# Patient Record
Sex: MALE | Race: OTHER | HISPANIC OR LATINO | URBAN - METROPOLITAN AREA
[De-identification: names, ages, dates, MRNs, and addresses within clinical notes are randomized per-mention and may not be internally consistent; named-entity substitution may affect disease eponyms.]

---

## 2023-09-21 VITALS
HEART RATE: 77 BPM | RESPIRATION RATE: 20 BRPM | HEIGHT: 65 IN | DIASTOLIC BLOOD PRESSURE: 90 MMHG | SYSTOLIC BLOOD PRESSURE: 152 MMHG | OXYGEN SATURATION: 99 % | TEMPERATURE: 98 F | WEIGHT: 208.34 LBS

## 2023-09-21 RX ORDER — POVIDONE-IODINE 5 %
1 AEROSOL (ML) TOPICAL ONCE
Refills: 0 | Status: COMPLETED | OUTPATIENT
Start: 2023-09-22 | End: 2023-09-22

## 2023-09-21 RX ORDER — CHLORHEXIDINE GLUCONATE 213 G/1000ML
1 SOLUTION TOPICAL EVERY 12 HOURS
Refills: 0 | Status: DISCONTINUED | OUTPATIENT
Start: 2023-09-22 | End: 2023-09-23

## 2023-09-21 NOTE — ASU PATIENT PROFILE, ADULT - NS PRO ABUSE SCREEN SUSPICION NEGLECT YN
Pt called requesting refill on the following: methadone (DOLOPHINE) 10 MG tablet.    Pt call back: [phone]    [facility]-confirmed   no

## 2023-09-21 NOTE — ASU PREOP CHECKLIST - 1.
Dr Fabian informed of elevated H/H;      Pt has no escort home ; MD office aware - Ruth to arrange for escort home.

## 2023-09-21 NOTE — ASU PATIENT PROFILE, ADULT - FALL HARM RISK - UNIVERSAL INTERVENTIONS
Bed in lowest position, wheels locked, appropriate side rails in place/Call bell, personal items and telephone in reach/Instruct patient to call for assistance before getting out of bed or chair/Non-slip footwear when patient is out of bed/Tanner to call system/Physically safe environment - no spills, clutter or unnecessary equipment/Purposeful Proactive Rounding/Room/bathroom lighting operational, light cord in reach

## 2023-09-21 NOTE — ASU PATIENT PROFILE, ADULT - HISTORY OF COVID-19 VACCINATION
[facility]     Dr Trace Cristobal  [address] [place] [id number]  [phone]     If you do not receive a phone call from the Dermatology office, please call their office to schedule an appointment.       In our practice, timely communication to you regarding your test results is a priority. We will communicate your results to you, either by phone call, mail or [facility]. You will always be contacted with test results, even if they are normal.    Please let us know if we are not meeting your expectations in this regard!    DR LANDAVERDE'S OFFICE HOURS ARE AS FOLLOWS:    MON               WE ARE OUT OF THE OFFICE  TUES               8:00 A.M. TO 4:30 P.M.  WED                8:00 A.M. TO 4:30 P.M.  THURS            9:00 A.M. TO 4:30 P.M.  FRI                   8:00 A.M. TO 3:00 P.M.       If you call our office for a medical reason or a med refill after Friday 1 PM, you will not receive an answer or refill until the following Tuesday. (we are out of the office Saturday, Sunday, and Monday)    IF IT IS AN URGENT MESSAGE, WE DO HAVE A TRIAGE NURSE AND AN MD ON CALL.    OFFICE PHONE NUMBER: [phone]  OFFICE FAX NUMBER: [phone]    In the next few weeks, you may receive a Press Ganey survey regarding your most recent clinic visit with us.  Please take a few moments out of your day to accurately evaluate your visit.  We strive to provide you with the best medical care and hope you are happy with our services. Again, thank you for your time and we look forward to your next visit.            
Yes

## 2023-09-21 NOTE — ASU PREOP CHECKLIST - LAST TOOK
Constitutional: She appears well-developed. HENT:   Head: Normocephalic. Right Ear: Hearing and external ear normal.   Left Ear: Hearing and external ear normal.   Nose: Nose normal.   Neck: Normal range of motion. Cardiovascular: Normal rate and regular rhythm. Pulmonary/Chest: Effort normal and breath sounds normal. No respiratory distress. She has no wheezes. She has no rales. Abdominal: Soft. Bowel sounds are normal.   Musculoskeletal:   Right side tender to palpate. Pain worse with movement. Neurological: She is alert. Skin: Skin is dry. Vitals reviewed. ASSESSMENT      ICD-10-CM ICD-9-CM    1. Muscle strain of chest wall, initial encounter S29.011A 848.8 diclofenac sodium (VOLTAREN) 1 % GEL  Restart Naproxen  Continue Norco prn   2. Side pain R10.9 789.00 Urine culture      POCT Urinalysis no Micro   3. Chronic pain syndrome G89.4 338.4 diclofenac sodium (VOLTAREN) 1 % GEL   4. Drug-induced constipation---possible, family unsure when the patient's last BM occurred K59.03 564.09 XR ABDOMEN (KUB) (SINGLE AP VIEW)     E980.5          PLAN    Orders Placed This Encounter   Procedures    Urine culture    XR ABDOMEN (KUB) (SINGLE AP VIEW)    POCT Urinalysis no Micro        Return if symptoms worsen or fail to improve. Patient Instructions       Patient Education        Abdominal Pain: Care Instructions  Your Care Instructions    Abdominal pain has many possible causes. Some aren't serious and get better on their own in a few days. Others need more testing and treatment. If your pain continues or gets worse, you need to be rechecked and may need more tests to find out what is wrong. You may need surgery to correct the problem. Don't ignore new symptoms, such as fever, nausea and vomiting, urination problems, pain that gets worse, and dizziness. These may be signs of a more serious problem. Your doctor may have recommended a follow-up visit in the next 8 to 12 hours.  If you are not getting better, you may need more tests or treatment. The doctor has checked you carefully, but problems can develop later. If you notice any problems or new symptoms, get medical treatment right away. Follow-up care is a key part of your treatment and safety. Be sure to make and go to all appointments, and call your doctor if you are having problems. It's also a good idea to know your test results and keep a list of the medicines you take. How can you care for yourself at home? · Rest until you feel better. · To prevent dehydration, drink plenty of fluids, enough so that your urine is light yellow or clear like water. Choose water and other caffeine-free clear liquids until you feel better. If you have kidney, heart, or liver disease and have to limit fluids, talk with your doctor before you increase the amount of fluids you drink. · If your stomach is upset, eat mild foods, such as rice, dry toast or crackers, bananas, and applesauce. Try eating several small meals instead of two or three large ones. · Wait until 48 hours after all symptoms have gone away before you have spicy foods, alcohol, and drinks that contain caffeine. · Do not eat foods that are high in fat. · Avoid anti-inflammatory medicines such as aspirin, ibuprofen (Advil, Motrin), and naproxen (Aleve). These can cause stomach upset. Talk to your doctor if you take daily aspirin for another health problem. When should you call for help? Call 911 anytime you think you may need emergency care. For example, call if:  ? · You passed out (lost consciousness). ? · You pass maroon or very bloody stools. ? · You vomit blood or what looks like coffee grounds. ? · You have new, severe belly pain. ?Call your doctor now or seek immediate medical care if:  ? · Your pain gets worse, especially if it becomes focused in one area of your belly. ? · You have a new or higher fever.    ? · Your stools are black and look like tar, or they have streaks of blood. ? · You have unexpected vaginal bleeding. ? · You have symptoms of a urinary tract infection. These may include:  ¨ Pain when you urinate. ¨ Urinating more often than usual.  ¨ Blood in your urine. ? · You are dizzy or lightheaded, or you feel like you may faint. ? Watch closely for changes in your health, and be sure to contact your doctor if:  ? · You are not getting better after 1 day (24 hours). Where can you learn more? Go to [email or web address] and sign in to your [employer] account. Enter T091 in the [facility] box to learn more about \"Abdominal Pain: Care Instructions. \"     If you do not have an account, please click on the \"Sign Up Now\" link. Current as of: March 20, 2017  Content Version: 11.5  © 2403-0833 [employer]. Care instructions adapted under license by [facility] ([facility]). If you have questions about a medical condition or this instruction, always ask your healthcare professional. [place] [address] any warranty or liability for your use of this information. Controlled Substances Monitoring:    n/a          Additional Instructions: As always, patient is advised to bring in medication bottles in order to correctly reconcile with our current list.    Chi Fry received counseling on the following healthy behaviors: n/a    Patient given educational materials on dx    I have instructed Chi Fry to complete a self tracking handout on n/a and instructed them to bring it with them to her next appointment. Discussed use, benefit, and side effects of prescribed medications. Barriers to medication compliance addressed. All patient questions answered. Pt voiced understanding.      LOVELY Mccabe solids

## 2023-09-21 NOTE — ASU PREOP CHECKLIST - HAND OFF
Unit RN to OR RN Acitretin Counseling:  I discussed with the patient the risks of acitretin including but not limited to hair loss, dry lips/skin/eyes, liver damage, hyperlipidemia, depression/suicidal ideation, photosensitivity.  Serious rare side effects can include but are not limited to pancreatitis, pseudotumor cerebri, bony changes, clot formation/stroke/heart attack.  Patient understands that alcohol is contraindicated since it can result in liver toxicity and significantly prolong the elimination of the drug by many years.

## 2023-09-22 ENCOUNTER — INPATIENT (INPATIENT)
Facility: HOSPITAL | Age: 28
LOS: 1 days | Discharge: HOME | DRG: 520 | End: 2023-09-24
Attending: NEUROLOGICAL SURGERY | Admitting: NEUROLOGICAL SURGERY
Payer: OTHER MISCELLANEOUS
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
## 2023-09-22 DIAGNOSIS — Z86.79 PERSONAL HISTORY OF OTHER DISEASES OF THE CIRCULATORY SYSTEM: ICD-10-CM

## 2023-09-22 DIAGNOSIS — Z87.39 PERSONAL HISTORY OF OTHER DISEASES OF THE MUSCULOSKELETAL SYSTEM AND CONNECTIVE TISSUE: ICD-10-CM

## 2023-09-22 LAB
BLD GP AB SCN SERPL QL: NEGATIVE — SIGNIFICANT CHANGE UP
RH IG SCN BLD-IMP: POSITIVE — SIGNIFICANT CHANGE UP

## 2023-09-22 PROCEDURE — 88304 TISSUE EXAM BY PATHOLOGIST: CPT | Mod: 26

## 2023-09-22 DEVICE — SURGIFLO HEMOSTATIC MATRIX KIT: Type: IMPLANTABLE DEVICE | Status: FUNCTIONAL

## 2023-09-22 RX ORDER — OXYCODONE AND ACETAMINOPHEN 5; 325 MG/1; MG/1
1 TABLET ORAL
Qty: 20 | Refills: 0
Start: 2023-09-22 | End: 2023-09-26

## 2023-09-22 RX ORDER — ACETAMINOPHEN 500 MG
650 TABLET ORAL EVERY 6 HOURS
Refills: 0 | Status: DISCONTINUED | OUTPATIENT
Start: 2023-09-22 | End: 2023-09-24

## 2023-09-22 RX ORDER — ONDANSETRON 8 MG/1
4 TABLET, FILM COATED ORAL EVERY 6 HOURS
Refills: 0 | Status: DISCONTINUED | OUTPATIENT
Start: 2023-09-22 | End: 2023-09-24

## 2023-09-22 RX ORDER — ASPIRIN/CALCIUM CARB/MAGNESIUM 324 MG
81 TABLET ORAL AT BEDTIME
Refills: 0 | Status: DISCONTINUED | OUTPATIENT
Start: 2023-09-22 | End: 2023-09-24

## 2023-09-22 RX ORDER — APREPITANT 80 MG/1
40 CAPSULE ORAL ONCE
Refills: 0 | Status: COMPLETED | OUTPATIENT
Start: 2023-09-22 | End: 2023-09-22

## 2023-09-22 RX ORDER — ONDANSETRON 8 MG/1
4 TABLET, FILM COATED ORAL
Qty: 0 | Refills: 0 | DISCHARGE
Start: 2023-09-22

## 2023-09-22 RX ORDER — SODIUM CHLORIDE 9 MG/ML
500 INJECTION INTRAMUSCULAR; INTRAVENOUS; SUBCUTANEOUS ONCE
Refills: 0 | Status: COMPLETED | OUTPATIENT
Start: 2023-09-22 | End: 2023-09-22

## 2023-09-22 RX ORDER — CYCLOBENZAPRINE HYDROCHLORIDE 10 MG/1
1 TABLET, FILM COATED ORAL
Qty: 15 | Refills: 0
Start: 2023-09-22 | End: 2023-09-26

## 2023-09-22 RX ORDER — SODIUM CHLORIDE 9 MG/ML
500 INJECTION, SOLUTION INTRAVENOUS ONCE
Refills: 0 | Status: COMPLETED | OUTPATIENT
Start: 2023-09-22 | End: 2023-09-22

## 2023-09-22 RX ORDER — ASPIRIN/CALCIUM CARB/MAGNESIUM 324 MG
1 TABLET ORAL
Qty: 0 | Refills: 0 | DISCHARGE
Start: 2023-09-22

## 2023-09-22 RX ORDER — ACETAMINOPHEN 500 MG
1000 TABLET ORAL ONCE
Refills: 0 | Status: COMPLETED | OUTPATIENT
Start: 2023-09-22 | End: 2023-09-22

## 2023-09-22 RX ORDER — OXYCODONE HYDROCHLORIDE 5 MG/1
1 TABLET ORAL
Qty: 0 | Refills: 0 | DISCHARGE
Start: 2023-09-22

## 2023-09-22 RX ORDER — ACETAMINOPHEN 500 MG
2 TABLET ORAL
Qty: 0 | Refills: 0 | DISCHARGE
Start: 2023-09-22

## 2023-09-22 RX ORDER — ASPIRIN/CALCIUM CARB/MAGNESIUM 324 MG
324 TABLET ORAL ONCE
Refills: 0 | Status: COMPLETED | OUTPATIENT
Start: 2023-09-22 | End: 2023-09-22

## 2023-09-22 RX ORDER — SODIUM CHLORIDE 9 MG/ML
500 INJECTION, SOLUTION INTRAVENOUS
Refills: 0 | Status: DISCONTINUED | OUTPATIENT
Start: 2023-09-22 | End: 2023-09-22

## 2023-09-22 RX ORDER — SENNA PLUS 8.6 MG/1
2 TABLET ORAL
Qty: 0 | Refills: 0 | DISCHARGE
Start: 2023-09-22

## 2023-09-22 RX ORDER — HYDROMORPHONE HYDROCHLORIDE 2 MG/ML
0.5 INJECTION INTRAMUSCULAR; INTRAVENOUS; SUBCUTANEOUS
Refills: 0 | Status: DISCONTINUED | OUTPATIENT
Start: 2023-09-22 | End: 2023-09-22

## 2023-09-22 RX ORDER — SENNA PLUS 8.6 MG/1
2 TABLET ORAL AT BEDTIME
Refills: 0 | Status: DISCONTINUED | OUTPATIENT
Start: 2023-09-22 | End: 2023-09-24

## 2023-09-22 RX ORDER — CELECOXIB 200 MG/1
200 CAPSULE ORAL ONCE
Refills: 0 | Status: COMPLETED | OUTPATIENT
Start: 2023-09-22 | End: 2023-09-22

## 2023-09-22 RX ORDER — OXYCODONE HYDROCHLORIDE 5 MG/1
5 TABLET ORAL EVERY 4 HOURS
Refills: 0 | Status: DISCONTINUED | OUTPATIENT
Start: 2023-09-22 | End: 2023-09-23

## 2023-09-22 RX ADMIN — SENNA PLUS 2 TABLET(S): 8.6 TABLET ORAL at 21:37

## 2023-09-22 RX ADMIN — CELECOXIB 200 MILLIGRAM(S): 200 CAPSULE ORAL at 06:51

## 2023-09-22 RX ADMIN — SODIUM CHLORIDE 500 MILLILITER(S): 9 INJECTION INTRAMUSCULAR; INTRAVENOUS; SUBCUTANEOUS at 16:45

## 2023-09-22 RX ADMIN — Medication 1 APPLICATION(S): at 06:50

## 2023-09-22 RX ADMIN — HYDROMORPHONE HYDROCHLORIDE 0.5 MILLIGRAM(S): 2 INJECTION INTRAMUSCULAR; INTRAVENOUS; SUBCUTANEOUS at 10:50

## 2023-09-22 RX ADMIN — SODIUM CHLORIDE 1000 MILLILITER(S): 9 INJECTION, SOLUTION INTRAVENOUS at 13:47

## 2023-09-22 RX ADMIN — OXYCODONE HYDROCHLORIDE 5 MILLIGRAM(S): 5 TABLET ORAL at 22:48

## 2023-09-22 RX ADMIN — HYDROMORPHONE HYDROCHLORIDE 0.5 MILLIGRAM(S): 2 INJECTION INTRAMUSCULAR; INTRAVENOUS; SUBCUTANEOUS at 11:50

## 2023-09-22 RX ADMIN — Medication 1000 MILLIGRAM(S): at 06:51

## 2023-09-22 RX ADMIN — HYDROMORPHONE HYDROCHLORIDE 0.5 MILLIGRAM(S): 2 INJECTION INTRAMUSCULAR; INTRAVENOUS; SUBCUTANEOUS at 10:35

## 2023-09-22 RX ADMIN — Medication 324 MILLIGRAM(S): at 07:29

## 2023-09-22 RX ADMIN — OXYCODONE HYDROCHLORIDE 5 MILLIGRAM(S): 5 TABLET ORAL at 18:30

## 2023-09-22 RX ADMIN — APREPITANT 40 MILLIGRAM(S): 80 CAPSULE ORAL at 06:51

## 2023-09-22 RX ADMIN — OXYCODONE HYDROCHLORIDE 5 MILLIGRAM(S): 5 TABLET ORAL at 23:40

## 2023-09-22 RX ADMIN — OXYCODONE HYDROCHLORIDE 5 MILLIGRAM(S): 5 TABLET ORAL at 17:56

## 2023-09-22 RX ADMIN — Medication 81 MILLIGRAM(S): at 21:37

## 2023-09-22 RX ADMIN — HYDROMORPHONE HYDROCHLORIDE 0.5 MILLIGRAM(S): 2 INJECTION INTRAMUSCULAR; INTRAVENOUS; SUBCUTANEOUS at 11:35

## 2023-09-22 RX ADMIN — HYDROMORPHONE HYDROCHLORIDE 0.5 MILLIGRAM(S): 2 INJECTION INTRAMUSCULAR; INTRAVENOUS; SUBCUTANEOUS at 10:19

## 2023-09-22 NOTE — H&P ADULT - NSHPPHYSICALEXAM_GEN_ALL_CORE
paravertebral spasm   slr 40 deg positive A&O x 3. PERRL, EOMI  CN ii to XII are grossly intact.   Heart:: S1S2, regular, tachycardia. SR tachy in monitor.  Lung: Clear lung sound.

## 2023-09-22 NOTE — H&P ADULT - HISTORY OF PRESENT ILLNESS
work related injury   pain low back and les  work related injury   pain low back and legs.  hx of tachycardia with negative work up.  Patient was admitted from PACU after L5-S1 laminectomy due to urinary retention and tachycardia.  He got straight cath that put out 1L of urine in PACU. After that he was able to urinate on his own 2 times after and was able to empty his bladder.  He was admitted for observation overnight.

## 2023-09-22 NOTE — PRE-ANESTHESIA EVALUATION ADULT - NSANTHOSAYNRD_GEN_A_CORE
No. KACI screening performed.  STOP BANG Legend: 0-2 = LOW Risk; 3-4 = INTERMEDIATE Risk; 5-8 = HIGH Risk

## 2023-09-22 NOTE — ASU DISCHARGE PLAN (ADULT/PEDIATRIC) - NS MD DC FALL RISK RISK
For information on Fall & Injury Prevention, visit: [email or web address] OR  [email or web address] OR  https://www.cdc.gov/steadi/patient.html

## 2023-09-22 NOTE — H&P ADULT - NSICDXPASTMEDICALHX_GEN_ALL_CORE_FT
PAST MEDICAL HISTORY:  H/O sinus tachycardia     History of low back pain     Work related injury Oct 2022

## 2023-09-22 NOTE — ASU DISCHARGE PLAN (ADULT/PEDIATRIC) - CARE PROVIDER_API CALL
Saurabh Castillo  Neurosurgery  [address], [address]  [place] [address]  Phone: [phone]  Fax: [phone]  Follow Up Time:

## 2023-09-22 NOTE — ASU DISCHARGE PLAN (ADULT/PEDIATRIC) - ASU DC SPECIAL INSTRUCTIONSFT
Please use stool softeners to avoid straining.  Drink plenty of water.  Call Dr. Castillo's office to confirm your follow-up appointment or for any clinical concerns.  No heavy lifting or bending. No driving or strenuous activity until your follow-up with Dr. Castillo

## 2023-09-23 LAB
ALBUMIN SERPL ELPH-MCNC: 4.8 G/DL — SIGNIFICANT CHANGE UP (ref 3.3–5)
ALP SERPL-CCNC: 79 U/L — SIGNIFICANT CHANGE UP (ref 40–120)
ALT FLD-CCNC: 51 U/L — HIGH (ref 10–45)
AMPHET UR-MCNC: NEGATIVE — SIGNIFICANT CHANGE UP
ANION GAP SERPL CALC-SCNC: 10 MMOL/L — SIGNIFICANT CHANGE UP (ref 5–17)
ANION GAP SERPL CALC-SCNC: 9 MMOL/L — SIGNIFICANT CHANGE UP (ref 5–17)
APTT BLD: 29.7 SEC — SIGNIFICANT CHANGE UP (ref 24.5–35.6)
AST SERPL-CCNC: 44 U/L — HIGH (ref 10–40)
BARBITURATES UR SCN-MCNC: NEGATIVE — SIGNIFICANT CHANGE UP
BENZODIAZ UR-MCNC: NEGATIVE — SIGNIFICANT CHANGE UP
BILIRUB SERPL-MCNC: 1 MG/DL — SIGNIFICANT CHANGE UP (ref 0.2–1.2)
BUN SERPL-MCNC: 10 MG/DL — SIGNIFICANT CHANGE UP (ref 7–23)
BUN SERPL-MCNC: 9 MG/DL — SIGNIFICANT CHANGE UP (ref 7–23)
CALCIUM SERPL-MCNC: 9.4 MG/DL — SIGNIFICANT CHANGE UP (ref 8.4–10.5)
CALCIUM SERPL-MCNC: 9.8 MG/DL — SIGNIFICANT CHANGE UP (ref 8.4–10.5)
CHLORIDE SERPL-SCNC: 102 MMOL/L — SIGNIFICANT CHANGE UP (ref 96–108)
CHLORIDE SERPL-SCNC: 106 MMOL/L — SIGNIFICANT CHANGE UP (ref 96–108)
CO2 SERPL-SCNC: 25 MMOL/L — SIGNIFICANT CHANGE UP (ref 22–31)
CO2 SERPL-SCNC: 27 MMOL/L — SIGNIFICANT CHANGE UP (ref 22–31)
COCAINE METAB.OTHER UR-MCNC: NEGATIVE — SIGNIFICANT CHANGE UP
CREAT SERPL-MCNC: 0.79 MG/DL — SIGNIFICANT CHANGE UP (ref 0.5–1.3)
CREAT SERPL-MCNC: 0.83 MG/DL — SIGNIFICANT CHANGE UP (ref 0.5–1.3)
EGFR: 123 ML/MIN/1.73M2 — SIGNIFICANT CHANGE UP
EGFR: 125 ML/MIN/1.73M2 — SIGNIFICANT CHANGE UP
GLUCOSE BLDC GLUCOMTR-MCNC: 111 MG/DL — HIGH (ref 70–99)
GLUCOSE SERPL-MCNC: 106 MG/DL — HIGH (ref 70–99)
GLUCOSE SERPL-MCNC: 115 MG/DL — HIGH (ref 70–99)
HCT VFR BLD CALC: 48.9 % — SIGNIFICANT CHANGE UP (ref 39–50)
HCT VFR BLD CALC: 52.9 % — HIGH (ref 39–50)
HGB BLD-MCNC: 16.9 G/DL — SIGNIFICANT CHANGE UP (ref 13–17)
HGB BLD-MCNC: 17.6 G/DL — HIGH (ref 13–17)
INR BLD: 1.06 — SIGNIFICANT CHANGE UP (ref 0.85–1.18)
MAGNESIUM SERPL-MCNC: 2 MG/DL — SIGNIFICANT CHANGE UP (ref 1.6–2.6)
MAGNESIUM SERPL-MCNC: 2.1 MG/DL — SIGNIFICANT CHANGE UP (ref 1.6–2.6)
MCHC RBC-ENTMCNC: 29.3 PG — SIGNIFICANT CHANGE UP (ref 27–34)
MCHC RBC-ENTMCNC: 29.7 PG — SIGNIFICANT CHANGE UP (ref 27–34)
MCHC RBC-ENTMCNC: 33.3 GM/DL — SIGNIFICANT CHANGE UP (ref 32–36)
MCHC RBC-ENTMCNC: 34.6 GM/DL — SIGNIFICANT CHANGE UP (ref 32–36)
MCV RBC AUTO: 85.9 FL — SIGNIFICANT CHANGE UP (ref 80–100)
MCV RBC AUTO: 88.2 FL — SIGNIFICANT CHANGE UP (ref 80–100)
METHADONE UR-MCNC: NEGATIVE — SIGNIFICANT CHANGE UP
NRBC # BLD: 0 /100 WBCS — SIGNIFICANT CHANGE UP (ref 0–0)
NRBC # BLD: 0 /100 WBCS — SIGNIFICANT CHANGE UP (ref 0–0)
OPIATES UR-MCNC: NEGATIVE — SIGNIFICANT CHANGE UP
PCP SPEC-MCNC: SIGNIFICANT CHANGE UP
PCP UR-MCNC: NEGATIVE — SIGNIFICANT CHANGE UP
PHOSPHATE SERPL-MCNC: 3.4 MG/DL — SIGNIFICANT CHANGE UP (ref 2.5–4.5)
PHOSPHATE SERPL-MCNC: 4.4 MG/DL — SIGNIFICANT CHANGE UP (ref 2.5–4.5)
PLATELET # BLD AUTO: 252 K/UL — SIGNIFICANT CHANGE UP (ref 150–400)
PLATELET # BLD AUTO: 294 K/UL — SIGNIFICANT CHANGE UP (ref 150–400)
POTASSIUM SERPL-MCNC: 3.6 MMOL/L — SIGNIFICANT CHANGE UP (ref 3.5–5.3)
POTASSIUM SERPL-MCNC: 4.6 MMOL/L — SIGNIFICANT CHANGE UP (ref 3.5–5.3)
POTASSIUM SERPL-SCNC: 3.6 MMOL/L — SIGNIFICANT CHANGE UP (ref 3.5–5.3)
POTASSIUM SERPL-SCNC: 4.6 MMOL/L — SIGNIFICANT CHANGE UP (ref 3.5–5.3)
PROT SERPL-MCNC: 7.8 G/DL — SIGNIFICANT CHANGE UP (ref 6–8.3)
PROTHROM AB SERPL-ACNC: 12.1 SEC — SIGNIFICANT CHANGE UP (ref 9.5–13)
RBC # BLD: 5.69 M/UL — SIGNIFICANT CHANGE UP (ref 4.2–5.8)
RBC # BLD: 6 M/UL — HIGH (ref 4.2–5.8)
RBC # FLD: 13.1 % — SIGNIFICANT CHANGE UP (ref 10.3–14.5)
RBC # FLD: 13.2 % — SIGNIFICANT CHANGE UP (ref 10.3–14.5)
SODIUM SERPL-SCNC: 138 MMOL/L — SIGNIFICANT CHANGE UP (ref 135–145)
SODIUM SERPL-SCNC: 141 MMOL/L — SIGNIFICANT CHANGE UP (ref 135–145)
THC UR QL: NEGATIVE — SIGNIFICANT CHANGE UP
WBC # BLD: 15.88 K/UL — HIGH (ref 3.8–10.5)
WBC # BLD: 18.01 K/UL — HIGH (ref 3.8–10.5)
WBC # FLD AUTO: 15.88 K/UL — HIGH (ref 3.8–10.5)
WBC # FLD AUTO: 18.01 K/UL — HIGH (ref 3.8–10.5)

## 2023-09-23 PROCEDURE — 99254 IP/OBS CNSLTJ NEW/EST MOD 60: CPT

## 2023-09-23 PROCEDURE — 99291 CRITICAL CARE FIRST HOUR: CPT

## 2023-09-23 PROCEDURE — 72125 CT NECK SPINE W/O DYE: CPT | Mod: 26

## 2023-09-23 PROCEDURE — 70450 CT HEAD/BRAIN W/O DYE: CPT | Mod: 26,77

## 2023-09-23 PROCEDURE — 72131 CT LUMBAR SPINE W/O DYE: CPT | Mod: 26

## 2023-09-23 PROCEDURE — 70450 CT HEAD/BRAIN W/O DYE: CPT | Mod: 26

## 2023-09-23 RX ORDER — METHOCARBAMOL 500 MG/1
500 TABLET, FILM COATED ORAL EVERY 8 HOURS
Refills: 0 | Status: DISCONTINUED | OUTPATIENT
Start: 2023-09-23 | End: 2023-09-23

## 2023-09-23 RX ORDER — POTASSIUM CHLORIDE 20 MEQ
40 PACKET (EA) ORAL ONCE
Refills: 0 | Status: COMPLETED | OUTPATIENT
Start: 2023-09-23 | End: 2023-09-23

## 2023-09-23 RX ORDER — TRAMADOL HYDROCHLORIDE 50 MG/1
25 TABLET ORAL EVERY 4 HOURS
Refills: 0 | Status: DISCONTINUED | OUTPATIENT
Start: 2023-09-23 | End: 2023-09-24

## 2023-09-23 RX ORDER — ACETAMINOPHEN 500 MG
1000 TABLET ORAL EVERY 6 HOURS
Refills: 0 | Status: DISCONTINUED | OUTPATIENT
Start: 2023-09-23 | End: 2023-09-24

## 2023-09-23 RX ORDER — TRAMADOL HYDROCHLORIDE 50 MG/1
50 TABLET ORAL EVERY 4 HOURS
Refills: 0 | Status: DISCONTINUED | OUTPATIENT
Start: 2023-09-23 | End: 2023-09-24

## 2023-09-23 RX ADMIN — Medication 81 MILLIGRAM(S): at 21:38

## 2023-09-23 RX ADMIN — TRAMADOL HYDROCHLORIDE 50 MILLIGRAM(S): 50 TABLET ORAL at 22:15

## 2023-09-23 RX ADMIN — TRAMADOL HYDROCHLORIDE 50 MILLIGRAM(S): 50 TABLET ORAL at 18:08

## 2023-09-23 RX ADMIN — Medication 40 MILLIEQUIVALENT(S): at 17:09

## 2023-09-23 RX ADMIN — TRAMADOL HYDROCHLORIDE 50 MILLIGRAM(S): 50 TABLET ORAL at 17:07

## 2023-09-23 RX ADMIN — TRAMADOL HYDROCHLORIDE 50 MILLIGRAM(S): 50 TABLET ORAL at 21:39

## 2023-09-23 RX ADMIN — SENNA PLUS 2 TABLET(S): 8.6 TABLET ORAL at 21:38

## 2023-09-23 NOTE — DISCHARGE NOTE PROVIDER - NSDCMRMEDTOKEN_GEN_ALL_CORE_FT
acetaminophen 325 mg oral tablet: 2 tab(s) orally every 6 hours As needed Temp greater or equal to 38C (100.4F), Mild Pain (1 - 3)  aspirin 81 mg oral delayed release tablet: 1 tab(s) orally once a day (at bedtime)  cyclobenzaprine 10 mg oral tablet: 1 tab(s) orally 3 times a day as needed for  muscle spasm MDD: 3  Innerclean oral tablet: 2 tab(s) orally once a day (at bedtime)  Medrol Dosepak 4 mg oral tablet: 1 tab(s) orally 4 times a day Take as directed MDD: 4  Percocet 5 mg-325 mg oral tablet: 1 tab(s) orally every 6 hours as needed for  severe pain MDD: 4   acetaminophen 325 mg oral tablet: 2 tab(s) orally every 6 hours As needed Temp greater or equal to 38C (100.4F), Mild Pain (1 - 3)  aspirin 81 mg oral delayed release tablet: 1 tab(s) orally once a day  Innerclean oral tablet: 2 tab(s) orally once a day (at bedtime)  Medrol Dosepak 4 mg oral tablet: 1 tab(s) orally 4 times a day Take as directed MDD: 4  traMADol 50 mg oral tablet: 1 tab(s) orally every 6 hours as needed for Severe Pain (7 - 10) 1 tab every 6 hours as needed for severe pain or 0.5 tabs every 6 hours as needed for moderate pain. MDD: 4 tabs

## 2023-09-23 NOTE — CONSULT NOTE ADULT - ASSESSMENT
27M no PMHx suffered work-related back injury resulting in LBP radiating down BL LE. S/p elective L5-S1 laminectomy (9/22/23).    #Lumbar back pain  #post operative state  - s/p elective L5-S1 laminectomy (9/22/23)  - Pain management per primary team 27M no PMHx suffered work-related back injury resulting in LBP radiating down BL LE. S/p elective L5-S1 laminectomy (9/22/23).    #Lumbar back pain  #post operative state  - s/p elective L5-S1 laminectomy (9/22/23)  - Pain management per primary team    #Vasovagal episode  #Fall  - Patient off tele standing in bathroom at time of event, tele reviewed episode of sinus ben at 5am while sleeping.  No other events  - caution with pain meds  - orthostatics  - CT head with small calcification vs bleed, CT C spine w/o fracture  - plan to repeat CT head for stability    #Erythrocytosis  - unclear etiology, Hgb 17 preop  - no signs of KACI, oxygen limiting events  - plan to f/u with heme outpatient  - inquire about anabolic steroids    #Leukocytosis  - likely reactive post op  - downtrending

## 2023-09-23 NOTE — CHART NOTE - NSCHARTNOTEFT_GEN_A_CORE
Patient was discharged by neurosurgery team. Patient then used restroom in hospital and reports he felt dizzy and fell. Reports he hit his head, denies LOC. Rapid response called. Patient c/o lumbar pain. Patient put in c-collar and used hard board to transfer patient from floor to stretched. Taken for urgent CTH, CT cervical/lumbar spine. Patient AAAOx3 KOMAL x4, exam at baseline.

## 2023-09-23 NOTE — DISCHARGE NOTE PROVIDER - NSDCFUADDINST_GEN_ALL_CORE_FT
Neurosurgery follow up appointment date/time:  - please call the office to confirm appointment: [phone]    Wound Care:  - keep dressing on until tomorrow, 9/24.  - You may shower 5 days after surgery. No bathing. Pat dry with a clean towel and leave open to air.  - no picking at incision    Activity:  - fatigue is common after surgery, rest if you feel tired   - no bending, lifting, twisting or heavy lifting   - walking is recommended, ambulate as tolerated  - no soaking in a tub/pool/hot tub   - no driving within 24 hours of anesthesia or while taking prescription pain medications   - keep hydrated, drink plenty of water     Please also follow up with your primary care doctor.     Pain Expectations:  - pain after surgery is expected  - please take pain meds as prescribed     Medications:  - You should take Percocet for moderate-severe pain as needed, and Flexeril for muscle spasm as needed.  - Please take the Medrol dose pack as directed on the package  - You should use stool softeners after surgery to avoid straining and pain. Pain medication can make you constipated.  - Avoid taking Advil (ibuprofen), Motrin (naproxen), or Aspirin for pain as they can cause bleeding   - Follow-up with Dr. Castillo directly if you need to resume Aspirin    Call the office or come to ED if:  - wound has drainage or bleeding, increased redness or pain at incision site, neurological change, fever (>101), chills, night sweats, syncope, nausea/vomiting, chest pain, shortness of breath      WITHIN 24 HOURS OF DISCHARGE, PLEASE CONTACT NEURO PA  WITH ANY QUESTIONS OR CONCERNS: [phone]   OTHERWISE, PLEASE CALL THE OFFICE WITH ANY QUESTIONS OR CONCERNS:  Neurosurgery follow up appointment date/time:  - please call the office to confirm appointment: [phone]    Wound Care:  - keep dressing on until tomorrow, 9/24.  - You may shower 5 days after surgery. No bathing. Pat dry with a clean towel and leave open to air.  - no picking at incision    Activity:  - fatigue is common after surgery, rest if you feel tired   - no bending, lifting, twisting or heavy lifting   - walking is recommended, ambulate as tolerated  - no soaking in a tub/pool/hot tub   - no driving within 24 hours of anesthesia or while taking prescription pain medications   - keep hydrated, drink plenty of water     Please also follow up with your primary care doctor.     Pain Expectations:  - pain after surgery is expected  - please take pain meds as prescribed     Medications:  - You should take Percocet for moderate-severe pain as needed, and Flexeril for muscle spasm as needed.  - Please take the Medrol dose pack as directed on the package  - You should use stool softeners after surgery to avoid straining and pain. Pain medication can make you constipated.  - You should resume your Aspirin 81mg daily dose	  - Avoid taking Advil (ibuprofen), Motrin (naproxen) for pain as they can cause bleeding       Call the office or come to ED if:  - wound has drainage or bleeding, increased redness or pain at incision site, neurological change, fever (>101), chills, night sweats, syncope, nausea/vomiting, chest pain, shortness of breath      WITHIN 24 HOURS OF DISCHARGE, PLEASE CONTACT NEURO PA  WITH ANY QUESTIONS OR CONCERNS: [phone]   OTHERWISE, PLEASE CALL THE OFFICE WITH ANY QUESTIONS OR CONCERNS:  Neurosurgery follow up appointment date/time:  - please call the office to confirm appointment: [phone]  - please follow up with a hematologist for your elevated hemoglobin.     Wound Care:  - keep dressing on until tomorrow, 9/24.  - You may shower tonight. Remove dressing when you get home and pat incision dry with a clean towel. Leave incision open to air. No lotions or creams.   - no picking at incision    Activity:  - fatigue is common after surgery, rest if you feel tired   - no bending, lifting, twisting or heavy lifting   - walking is recommended, ambulate as tolerated  - no soaking in a tub/pool/hot tub   - no driving within 24 hours of anesthesia or while taking prescription pain medications   - keep hydrated, drink plenty of water     Please also follow up with your primary care doctor.     Pain Expectations:  - pain after surgery is expected  - please take pain meds as prescribed     Medications:  - You should take Percocet every 6 hours as needed for moderate-severe pain as needed, and Flexeril every 8 hours for muscle spasm as needed.  - Please take the Medrol dose pack as directed on the package  - You should use stool softeners after surgery to avoid straining and pain. Pain medication can make you constipated.  - You should resume your Aspirin 81mg daily dose	  - Avoid taking Advil (ibuprofen), Motrin (naproxen) for pain as they can cause bleeding       Call the office or come to ED if:  - wound has drainage or bleeding, increased redness or pain at incision site, neurological change, fever (>101), chills, night sweats, syncope, nausea/vomiting, chest pain, shortness of breath      WITHIN 24 HOURS OF DISCHARGE, PLEASE CONTACT NEURO PA  WITH ANY QUESTIONS OR CONCERNS: [phone]   OTHERWISE, PLEASE CALL THE OFFICE WITH ANY QUESTIONS OR CONCERNS: [phone].  Neurosurgery follow up appointment date/time:  - please call the office to confirm appointment: [phone]  - please follow up with a hematologist for your elevated hemoglobin. Follow up with your primary care provider to help facilitate this.     Wound Care:  - You may shower tonight. pat incision dry with a clean towel. Leave incision open to air. No lotions or creams.   - no picking at incision    Activity:  - fatigue is common after surgery, rest if you feel tired   - no bending, lifting, twisting or heavy lifting   - walking is recommended, ambulate as tolerated  - no soaking in a tub/pool/hot tub   - no driving within 24 hours of anesthesia or while taking prescription pain medications   - keep hydrated, drink plenty of water     Please also follow up with your primary care doctor.     Pain Expectations:  - pain after surgery is expected  - please take pain meds as prescribed     Medications:  - You should take Tramadol 50mg every 6 hours as needed for severe pain and 25mg every 6 hours as needed for moderate pain.   - Please take the Medrol dose pack as directed on the package  - You should use stool softeners after surgery to avoid straining and pain. Pain medication can make you constipated.  - You should resume your Aspirin 81mg daily dose	  - Avoid taking Advil (ibuprofen), Motrin (naproxen) for pain as they can cause bleeding       Call the office or come to ED if:  - wound has drainage or bleeding, increased redness or pain at incision site, neurological change, fever (>101), chills, night sweats, syncope, nausea/vomiting, chest pain, shortness of breath      WITHIN 24 HOURS OF DISCHARGE, PLEASE CONTACT NEURO PA  WITH ANY QUESTIONS OR CONCERNS: [phone]   OTHERWISE, PLEASE CALL THE OFFICE WITH ANY QUESTIONS OR CONCERNS: [phone].

## 2023-09-23 NOTE — CHART NOTE - NSCHARTNOTEFT_GEN_A_CORE
***Rapid Response Clinical Impact Nurse Practitioner Note***    HPI:  work related injury   pain low back and legs.  hx of tachycardia with negative work up.  Patient was admitted from PACU after L5-S1 laminectomy due to urinary retention and tachycardia.  He got straight cath that put out 1L of urine in PACU. After that he was able to urinate on his own 2 times after and was able to empty his bladder.  He was admitted for observation overnight. (22 Sep 2023 06:24)      Rapid response team called because    Patient was seen and examined at the bedside by the rapid response team.    Allergies    No Known Allergies    Intolerances        PAST MEDICAL & SURGICAL HISTORY:  Work related injury  Oct 2022      History of low back pain      H/O sinus tachycardia      History of back surgery  Dec 2022- lumbar          Vital Signs Last 24 Hrs  T(C): 36.6 (23 Sep 2023 10:03), Max: 36.7 (22 Sep 2023 16:47)  T(F): 97.8 (23 Sep 2023 10:03), Max: 98.1 (22 Sep 2023 16:47)  HR: 83 (23 Sep 2023 10:03) (74 - 120)  BP: 154/70 (23 Sep 2023 10:03) (121/73 - 154/70)  BP(mean): 85 (22 Sep 2023 16:47) (85 - 94)  RR: 19 (23 Sep 2023 10:03) (10 - 25)  SpO2: 97% (23 Sep 2023 10:03) (97% - 99%)    Parameters below as of 23 Sep 2023 10:03  Patient On (Oxygen Delivery Method): room air        ROS:     GENERAL: The patient is awake and alert in no apparent distress.   HEENT: Head is normocephalic and atraumatic. Extraocular muscles are intact. Mucous membranes are moist. No throat erythema/exudates no lymphadenopathy, no JVD,   NECK: Supple.  LUNGS: Clear to auscultation BL without wheezing, rales or rhonchi; respirations unlabored  HEART: Regular rate and rhythm ,+S1/+S2, no murmurs, rubs, gallops  ABDOMEN: Soft, nontender, and nondistended, no rebound, guarding rigidity, bowel sounds in all 4 quadrants  EXTREMITIES: Without any cyanosis, clubbing, rash, lesions or edema.  SKIN: No new rashes or lesions.  MSK: strength equal BL  VASCULAR: Radial and Dorsal pedal pulses palpable BL  NEUROLOGIC: Grossly intact.  PSYCH: No new changes.    09-22 @ 07:01  -  09-23 @ 07:00  --------------------------------------------------------  IN: 1980 mL / OUT: 3950 mL / NET: -1970 mL                              17.6   18.01 )-----------( 294      ( 23 Sep 2023 06:30 )             52.9     09-23    138  |  102  |  9   ----------------------------<  115<H>  4.6   |  27  |  0.83    Ca    9.8      23 Sep 2023 06:30  Phos  4.4     09-23  Mg     2.0     09-23    TPro  7.8  /  Alb  4.8  /  TBili  1.0  /  DBili  x   /  AST  44<H>  /  ALT  51<H>  /  AlkPhos  79  09-23         LIVER FUNCTIONS - ( 23 Sep 2023 06:30 )  Alb: 4.8 g/dL / Pro: 7.8 g/dL / ALK PHOS: 79 U/L / ALT: 51 U/L / AST: 44 U/L / GGT: x         Urinalysis Basic - ( 23 Sep 2023 06:30 )    Color: x / Appearance: x / SG: x / pH: x  Gluc: 115 mg/dL / Ketone: x  / Bili: x / Urobili: x   Blood: x / Protein: x / Nitrite: x   Leuk Esterase: x / RBC: x / WBC x   Sq Epi: x / Non Sq Epi: x / Bacteria: x             MEDICATIONS  (STANDING):  aspirin enteric coated 81 milliGRAM(s) Oral at bedtime  senna 2 Tablet(s) Oral at bedtime    MEDICATIONS  (PRN):  acetaminophen     Tablet .. 650 milliGRAM(s) Oral every 6 hours PRN Temp greater or equal to 38C (100.4F), Mild Pain (1 - 3)  acetaminophen     Tablet .. 1000 milliGRAM(s) Oral every 6 hours PRN Mild Pain (1 - 3)  ondansetron Injectable 4 milliGRAM(s) IV Push every 6 hours PRN Nausea and/or Vomiting  traMADol 50 milliGRAM(s) Oral every 4 hours PRN Severe Pain (7 - 10)  traMADol 25 milliGRAM(s) Oral every 4 hours PRN Moderate Pain (4 - 6)      Assessment- Rapid Response called for 27y year old Male with a past medical history of     Plan- ***Rapid Response Clinical Impact Nurse Practitioner Note***    HPI:  work related injury   pain low back and legs.  hx of tachycardia with negative work up.  Patient was admitted from PACU after L5-S1 laminectomy due to urinary retention and tachycardia.  He got straight cath that put out 1L of urine in PACU. After that he was able to urinate on his own 2 times after and was able to empty his bladder.  He was admitted for observation overnight. (22 Sep 2023 06:24)    Rapid response team called at approx [phone] for fall. Pt was washing his face at the sink in the patient bathroom when he suddenly felt flush and dizzy. Pt attempted to grab hold of handrail but fell forward reportedly striking his forehead against the handrail. Pt stood himself up and attempted to walk back to his bed then syncopized.     Patient was seen and examined at the bedside by the rapid response team. Upon arrival of writer, pt found laying supine on the floor, awake, alert, with recollection of events as described above. Manual c-spine stabilization applied. Pt complaining of 10/10 back pain. NS team at bedside. C-collar applied. Pt logged rolled onto backboard and lifted back to bed maintaining spinal precautions. Pt prepared for urgent CTH, CT cervical/lumbar spine.    Allergies    No Known Allergies    Intolerances    PAST MEDICAL & SURGICAL HISTORY:  Work related injury  Oct 2022  History of low back pain  H/O sinus tachycardia  History of back surgery  Dec 2022- lumbar    Vital Signs Last 24 Hrs  T(C): 36.6 (23 Sep 2023 10:03), Max: 36.7 (22 Sep 2023 16:47)  T(F): 97.8 (23 Sep 2023 10:03), Max: 98.1 (22 Sep 2023 16:47)  HR: 83 (23 Sep 2023 10:03) (74 - 120)  BP: 154/70 (23 Sep 2023 10:03) (121/73 - 154/70)  BP(mean): 85 (22 Sep 2023 16:47) (85 - 94)  RR: 19 (23 Sep 2023 10:03) (10 - 25)  SpO2: 97% (23 Sep 2023 10:03) (97% - 99%)    Parameters below as of 23 Sep 2023 10:03  Patient On (Oxygen Delivery Method): room air    REVIEW OF SYSTEMS:  CONSTITUTIONAL: No weakness, fevers or chills  EYES/ENT: No visual changes;  No vertigo or throat pain   NECK: + pain  RESPIRATORY: No cough, wheezing, hemoptysis; No shortness of breath  CARDIOVASCULAR: No chest pain or palpitations  GASTROINTESTINAL: No abdominal or epigastric pain. No nausea, vomiting, or hematemesis; No diarrhea or constipation. No melena or hematochezia.  GENITOURINARY: No dysuria, frequency or hematuria  NEUROLOGICAL: No numbness or weakness  SKIN: No itching, burning, rashes, or lesions   All other review of systems is negative unless indicated above.    GENERAL: The patient is awake and alert, appears mildly distressed.   HEENT: Head is normocephalic and atraumatic. Extraocular muscles are intact. Mucous membranes are moist. No throat erythema/exudates no lymphadenopathy, no JVD,   NECK: c-collar  LUNGS: Clear to auscultation BL without wheezing, rales or rhonchi; respirations unlabored  HEART: Regular rate and rhythm ,+S1/+S2, no murmurs, rubs, gallops  ABDOMEN: Soft, nontender, and nondistended, no rebound, guarding rigidity, bowel sounds in all 4 quadrants  EXTREMITIES: Without any cyanosis, clubbing, rash, lesions or edema.  SKIN: No new rashes or lesions.  MSK: strength equal BL  VASCULAR: Radial and Dorsal pedal pulses palpable BL  NEUROLOGIC: Grossly intact.  PSYCH: No new changes.    09-22 @ 07:01  -  09-23 @ 07:00  --------------------------------------------------------  IN: 1980 mL / OUT: 3950 mL / NET: -1970 mL                          17.6   18.01 )-----------( 294      ( 23 Sep 2023 06:30 )             52.9     09-23    138  |  102  |  9   ----------------------------<  115<H>  4.6   |  27  |  0.83    Ca    9.8      23 Sep 2023 06:30  Phos  4.4     09-23  Mg     2.0     09-23    TPro  7.8  /  Alb  4.8  /  TBili  1.0  /  DBili  x   /  AST  44<H>  /  ALT  51<H>  /  AlkPhos  79  09-23         LIVER FUNCTIONS - ( 23 Sep 2023 06:30 )  Alb: 4.8 g/dL / Pro: 7.8 g/dL / ALK PHOS: 79 U/L / ALT: 51 U/L / AST: 44 U/L / GGT: x         Urinalysis Basic - ( 23 Sep 2023 06:30 )    Color: x / Appearance: x / SG: x / pH: x  Gluc: 115 mg/dL / Ketone: x  / Bili: x / Urobili: x   Blood: x / Protein: x / Nitrite: x   Leuk Esterase: x / RBC: x / WBC x   Sq Epi: x / Non Sq Epi: x / Bacteria: x      MEDICATIONS  (STANDING):  aspirin enteric coated 81 milliGRAM(s) Oral at bedtime  senna 2 Tablet(s) Oral at bedtime    MEDICATIONS  (PRN):  acetaminophen     Tablet .. 650 milliGRAM(s) Oral every 6 hours PRN Temp greater or equal to 38C (100.4F), Mild Pain (1 - 3)  acetaminophen     Tablet .. 1000 milliGRAM(s) Oral every 6 hours PRN Mild Pain (1 - 3)  ondansetron Injectable 4 milliGRAM(s) IV Push every 6 hours PRN Nausea and/or Vomiting  traMADol 50 milliGRAM(s) Oral every 4 hours PRN Severe Pain (7 - 10)  traMADol 25 milliGRAM(s) Oral every 4 hours PRN Moderate Pain (4 - 6)      Assessment-   27M with PMHx  work-related back injury resulting, admitted for elective L5-S1 laminectomy (9/22/23), now s/p RRT for syncope likely vasovagal with fall with concern for injury.     Plan-  Maintain spine precautions.  Stat CT head and spine.  Orthostatics when cleared for ambulation.  Reevaluated pain regimen. Caution with narcotics.  Fall precautions.  Maintain telemetry.    Case discussed with ICU consult, PCCM Fellow and primary team.      I have personally and independently provided 31 minutes of critical care services.  This excludes any time spent on separate procedures or teaching.

## 2023-09-23 NOTE — DISCHARGE NOTE PROVIDER - NSDCCPCAREPLAN_GEN_ALL_CORE_FT
PRINCIPAL DISCHARGE DIAGNOSIS  Diagnosis: History of low back pain  Assessment and Plan of Treatment: L5-S1      SECONDARY DISCHARGE DIAGNOSES  Diagnosis: Urinary retention  Assessment and Plan of Treatment: improved

## 2023-09-23 NOTE — CONSULT NOTE ADULT - SUBJECTIVE AND OBJECTIVE BOX
Patient is a 27y old  Male who presents with a chief complaint of pain low back radiating down right leg. (23 Sep 2023 08:46)      HPI:  work related injury   pain low back and legs.  hx of tachycardia with negative work up.  Patient was admitted from PACU after L5-S1 laminectomy due to urinary retention and tachycardia.  He got straight cath that put out 1L of urine in PACU. After that he was able to urinate on his own 2 times after and was able to empty his bladder.  He was admitted for observation overnight. (22 Sep 2023 06:24)      REVIEW OF SYSTEMS: see HPI    PAST MEDICAL & SURGICAL HISTORY:  Work related injury  Oct 2022      History of low back pain      H/O sinus tachycardia      History of back surgery  Dec 2022- lumbar          FAMILY HISTORY:    SOCIAL HISTORY:  Tobacco use:  EtOH use:  Recreational drug use:    MEDICATIONS:  MEDICATIONS  (STANDING):  aspirin enteric coated 81 milliGRAM(s) Oral at bedtime  potassium chloride    Tablet ER 40 milliEquivalent(s) Oral once  senna 2 Tablet(s) Oral at bedtime    MEDICATIONS  (PRN):  acetaminophen     Tablet .. 650 milliGRAM(s) Oral every 6 hours PRN Temp greater or equal to 38C (100.4F), Mild Pain (1 - 3)  acetaminophen     Tablet .. 1000 milliGRAM(s) Oral every 6 hours PRN Mild Pain (1 - 3)  ondansetron Injectable 4 milliGRAM(s) IV Push every 6 hours PRN Nausea and/or Vomiting  traMADol 50 milliGRAM(s) Oral every 4 hours PRN Severe Pain (7 - 10)  traMADol 25 milliGRAM(s) Oral every 4 hours PRN Moderate Pain (4 - 6)      ALLERGIES:  Allergies    No Known Allergies    Intolerances        VITAL SIGNS:  Vital Signs Last 24 Hrs  T(C): 36.4 (23 Sep 2023 16:50), Max: 36.7 (22 Sep 2023 17:48)  T(F): 97.6 (23 Sep 2023 16:50), Max: 98 (22 Sep 2023 17:48)  HR: 74 (23 Sep 2023 16:50) (74 - 110)  BP: 159/69 (23 Sep 2023 16:50) (124/58 - 159/69)  BP(mean): --  RR: 18 (23 Sep 2023 16:50) (17 - 22)  SpO2: 97% (23 Sep 2023 16:50) (97% - 99%)    Parameters below as of 23 Sep 2023 16:50  Patient On (Oxygen Delivery Method): room air        09-22-23 @ 07:01  -  09-23-23 @ 07:00  --------------------------------------------------------  IN:    IV PiggyBack: 1000 mL    Oral Fluid: 980 mL  Total IN: 1980 mL    OUT:    Intermittent Catheterization - Urethral (mL): 1000 mL    Voided (mL): 2950 mL  Total OUT: 3950 mL    Total NET: -1970 mL      09-23-23 @ 07:01  -  09-23-23 @ 16:52  --------------------------------------------------------  IN:    Oral Fluid: 700 mL  Total IN: 700 mL    OUT:    Voided (mL): 500 mL  Total OUT: 500 mL    Total NET: 200 mL        PHYSICAL EXAM:  Constitutional: NAD, comfortable in bed.  HEENT: NC/AT, PERRLA, EOMI, MMM  Neck: Supple, in C collar  Respiratory: CTA B/L. No w/r/r.   Cardiovascular: RRR, normal S1 and S2, no m/r/g.   Gastrointestinal: +BS, soft NTND, no guarding or rebound tenderness, no palpable masses   Extremities: wwp; no cyanosis, clubbing or edema.   Vascular: Pulses equal and strong throughout.   Neurological: AAOx3, no CN deficits, strength and sensation intact throughout.   Skin: No gross skin abnormalities or rashes        LABS:                        16.9   15.88 )-----------( 252      ( 23 Sep 2023 12:47 )             48.9     09-23    141  |  106  |  10  ----------------------------<  106<H>  3.6   |  25  |  0.79    Ca    9.4      23 Sep 2023 12:47  Phos  3.4     09-23  Mg     2.1     09-23    TPro  7.8  /  Alb  4.8  /  TBili  1.0  /  DBili  x   /  AST  44<H>  /  ALT  51<H>  /  AlkPhos  79  09-23    PT/INR - ( 23 Sep 2023 12:47 )   PT: 12.1 sec;   INR: 1.06          PTT - ( 23 Sep 2023 12:47 )  PTT:29.7 sec  Urinalysis Basic - ( 23 Sep 2023 12:47 )    Color: x / Appearance: x / SG: x / pH: x  Gluc: 106 mg/dL / Ketone: x  / Bili: x / Urobili: x   Blood: x / Protein: x / Nitrite: x   Leuk Esterase: x / RBC: x / WBC x   Sq Epi: x / Non Sq Epi: x / Bacteria: x          CAPILLARY BLOOD GLUCOSE      POCT Blood Glucose.: 111 mg/dL (23 Sep 2023 10:58)          RADIOLOGY & ADDITIONAL TESTS: Reviewed. Patient is a 27y old  Male who presents with a chief complaint of pain low back radiating down right leg. (23 Sep 2023 08:46)      HPI:  work related injury   pain low back and legs.  hx of tachycardia with negative work up.  Patient was admitted from PACU after L5-S1 laminectomy due to urinary retention and tachycardia.  He got straight cath that put out 1L of urine in PACU. After that he was able to urinate on his own 2 times after and was able to empty his bladder.  He was admitted for observation overnight. (22 Sep 2023 06:24)    Patient seen and examined at bedside.  Rapid response after fall in bathroom with head contact.  Description of event similar to vagal event in bathroom.  Was off tele at the time.  Currently with some back pain and discomfort due to C collar after CT C spine and head.  Has some dizziness.  Denies fever, chills, CP, SOB, N/V, abdominal pain.    REVIEW OF SYSTEMS: see HPI    PAST MEDICAL & SURGICAL HISTORY:  Work related injury  Oct 2022      History of low back pain      H/O sinus tachycardia      History of back surgery  Dec 2022- lumbar      MEDICATIONS:  MEDICATIONS  (STANDING):  aspirin enteric coated 81 milliGRAM(s) Oral at bedtime  potassium chloride    Tablet ER 40 milliEquivalent(s) Oral once  senna 2 Tablet(s) Oral at bedtime    MEDICATIONS  (PRN):  acetaminophen     Tablet .. 650 milliGRAM(s) Oral every 6 hours PRN Temp greater or equal to 38C (100.4F), Mild Pain (1 - 3)  acetaminophen     Tablet .. 1000 milliGRAM(s) Oral every 6 hours PRN Mild Pain (1 - 3)  ondansetron Injectable 4 milliGRAM(s) IV Push every 6 hours PRN Nausea and/or Vomiting  traMADol 50 milliGRAM(s) Oral every 4 hours PRN Severe Pain (7 - 10)  traMADol 25 milliGRAM(s) Oral every 4 hours PRN Moderate Pain (4 - 6)      ALLERGIES:  Allergies    No Known Allergies    Intolerances        VITAL SIGNS:  Vital Signs Last 24 Hrs  T(C): 36.4 (23 Sep 2023 16:50), Max: 36.7 (22 Sep 2023 17:48)  T(F): 97.6 (23 Sep 2023 16:50), Max: 98 (22 Sep 2023 17:48)  HR: 74 (23 Sep 2023 16:50) (74 - 110)  BP: 159/69 (23 Sep 2023 16:50) (124/58 - 159/69)  BP(mean): --  RR: 18 (23 Sep 2023 16:50) (17 - 22)  SpO2: 97% (23 Sep 2023 16:50) (97% - 99%)    Parameters below as of 23 Sep 2023 16:50  Patient On (Oxygen Delivery Method): room air        09-22-23 @ 07:01  -  09-23-23 @ 07:00  --------------------------------------------------------  IN:    IV PiggyBack: 1000 mL    Oral Fluid: 980 mL  Total IN: 1980 mL    OUT:    Intermittent Catheterization - Urethral (mL): 1000 mL    Voided (mL): 2950 mL  Total OUT: 3950 mL    Total NET: -1970 mL      09-23-23 @ 07:01  -  09-23-23 @ 16:52  --------------------------------------------------------  IN:    Oral Fluid: 700 mL  Total IN: 700 mL    OUT:    Voided (mL): 500 mL  Total OUT: 500 mL    Total NET: 200 mL        PHYSICAL EXAM:  Constitutional: NAD, comfortable in bed.  HEENT: NC/AT, PERRLA, EOMI, MMM  Neck: Supple, in C collar  Respiratory: CTA B/L. No w/r/r.   Cardiovascular: RRR, normal S1 and S2, no m/r/g.   Gastrointestinal: +BS, soft NTND, no guarding or rebound tenderness, no palpable masses   Extremities: wwp; no cyanosis, clubbing or edema.   Vascular: Pulses equal and strong throughout.   Neurological: AAOx3, no CN deficits, strength and sensation intact throughout.   Skin: No gross skin abnormalities or rashes        LABS:                        16.9   15.88 )-----------( 252      ( 23 Sep 2023 12:47 )             48.9     09-23    141  |  106  |  10  ----------------------------<  106<H>  3.6   |  25  |  0.79    Ca    9.4      23 Sep 2023 12:47  Phos  3.4     09-23  Mg     2.1     09-23    TPro  7.8  /  Alb  4.8  /  TBili  1.0  /  DBili  x   /  AST  44<H>  /  ALT  51<H>  /  AlkPhos  79  09-23    PT/INR - ( 23 Sep 2023 12:47 )   PT: 12.1 sec;   INR: 1.06          PTT - ( 23 Sep 2023 12:47 )  PTT:29.7 sec  Urinalysis Basic - ( 23 Sep 2023 12:47 )    Color: x / Appearance: x / SG: x / pH: x  Gluc: 106 mg/dL / Ketone: x  / Bili: x / Urobili: x   Blood: x / Protein: x / Nitrite: x   Leuk Esterase: x / RBC: x / WBC x   Sq Epi: x / Non Sq Epi: x / Bacteria: x          CAPILLARY BLOOD GLUCOSE      POCT Blood Glucose.: 111 mg/dL (23 Sep 2023 10:58)          RADIOLOGY & ADDITIONAL TESTS: Reviewed.

## 2023-09-23 NOTE — DISCHARGE NOTE PROVIDER - NSDCFUADDAPPT_GEN_ALL_CORE_FT
Please call Dr. Castillo's office for follow-up appointment. Please call Dr. Castillo's office for follow-up appointment.    Please follow up with your primary care provider to have care set up with a hematologist. Upon discharge from the hospital call medical records to request a copy of your laboratory results. Please call Dr. Castillo's office for follow-up appointment.    Please follow up with your primary care provider to have care set up with a hematologist. Please bring your laboratory results to your appointment.

## 2023-09-23 NOTE — DISCHARGE NOTE PROVIDER - HOSPITAL COURSE
HPI: work related injury   pain low back and legs.  hx of tachycardia with negative work up.  Patient was admitted from PACU after L5-S1 laminectomy due to urinary retention and tachycardia.  He got straight cath that put out 1L of urine in PACU. After that he was able to urinate on his own 2 times after and was able to empty his bladder.  He was admitted for observation overnight.    Hospital Course: Admitted overnight for urinary retention, now improved.    Patient evaluated by PT/OT who recommended: n/a  Patient is going home? rehab? hospice? Facility Name: Home     Hospital course c/b: urinary retention    Exam on day of discharge:  General: Pt is sitting up comfortably in bed, in NAD, on RA  HEENT: CN II-XII grossly intact, PERRL 3mm, EOMI B/L, face symmetric  Cardiovascular: RRR, normal S1 and S2   Respiratory: non-labored breathing, symmetric chest rise   GI: abd soft, NTND   Neuro: Macedonian speaking, A&O x 3, no aphasia, speech clear, no dysmetria, no pronator drift  Strength 5/5 throughout all 4 extremities  Sensation intact to light touch throughout   Vascular: Distal pulses 2+ x4, no calf edema or erythema  Wounds: Lumbar wound C/D/I   HPI: work related injury   pain low back and legs.  hx of tachycardia with negative work up.  Patient was admitted from PACU after L5-S1 laminectomy due to urinary retention and tachycardia.  He got straight cath that put out 1L of urine in PACU. After that he was able to urinate on his own 2 times after and was able to empty his bladder.  He was admitted for observation overnight.    Hospital Course:   9/22: POD 0 s/p L5-S1 microdiscectomy. Admit to tele 2/2 urinary retention.   9/23: POD 1, LUCITA overnight. Neuro exam stable. Patient was discharged by neurosurgery team. Patient then used restroom in hospital room and reports he felt dizzy and fell. Reports he hit his head, denies LOC. Rapid response called. Patient c/o lumbar pain. Patient put in c-collar and used hard board to transfer patient from floor to stretcher. Taken for urgent CTH, CT cervical/lumbar spine. Patient AAAOx3 KOMAL x4, exam at baseline. CT showed small hyperattenuation, likely calcification.   9/24: POD 2, LUCITA overnight. Voiding. Neuro exam stable. Stability CTH stable. Given 1L NS bolus for tachycardia and ekg completed.      Patient evaluated by PT/OT who recommended: no needs  Patient is going Home     Hospital course c/b: urinary retention, tachycardia - improved s/p IVF bolus    Exam on day of discharge:  General: Pt is sitting up comfortably in bed, in NAD, on RA  HEENT: CN II-XII grossly intact, PERRL 3mm, EOMI B/L, face symmetric  Cardiovascular: RRR, normal S1 and S2   Respiratory: non-labored breathing, symmetric chest rise   GI: abd soft, NTND   Neuro: Persian speaking, A&O x 3, no aphasia, speech clear, no dysmetria, no pronator drift  Strength 5/5 throughout all 4 extremities  Sensation intact to light touch throughout   Vascular: Distal pulses 2+ x4, no calf edema or erythema  Wounds: Lumbar wound C/D/I

## 2023-09-23 NOTE — DISCHARGE NOTE PROVIDER - CARE PROVIDER_API CALL
Saurabh Castillo  Neurosurgery  [address], [address]  [place] [address]  Phone: [phone]  Fax: [phone]  Follow Up Time:

## 2023-09-23 NOTE — PATIENT PROFILE ADULT - FALL HARM RISK - HARM RISK INTERVENTIONS

## 2023-09-24 VITALS
HEART RATE: 125 BPM | OXYGEN SATURATION: 98 % | DIASTOLIC BLOOD PRESSURE: 89 MMHG | RESPIRATION RATE: 19 BRPM | SYSTOLIC BLOOD PRESSURE: 145 MMHG

## 2023-09-24 PROCEDURE — 82962 GLUCOSE BLOOD TEST: CPT

## 2023-09-24 PROCEDURE — 88304 TISSUE EXAM BY PATHOLOGIST: CPT

## 2023-09-24 PROCEDURE — 70450 CT HEAD/BRAIN W/O DYE: CPT

## 2023-09-24 PROCEDURE — 76000 FLUOROSCOPY <1 HR PHYS/QHP: CPT

## 2023-09-24 PROCEDURE — 97161 PT EVAL LOW COMPLEX 20 MIN: CPT

## 2023-09-24 PROCEDURE — 85027 COMPLETE CBC AUTOMATED: CPT

## 2023-09-24 PROCEDURE — 86901 BLOOD TYPING SEROLOGIC RH(D): CPT

## 2023-09-24 PROCEDURE — 83735 ASSAY OF MAGNESIUM: CPT

## 2023-09-24 PROCEDURE — 85610 PROTHROMBIN TIME: CPT

## 2023-09-24 PROCEDURE — 99232 SBSQ HOSP IP/OBS MODERATE 35: CPT

## 2023-09-24 PROCEDURE — 85730 THROMBOPLASTIN TIME PARTIAL: CPT

## 2023-09-24 PROCEDURE — 72131 CT LUMBAR SPINE W/O DYE: CPT

## 2023-09-24 PROCEDURE — 80307 DRUG TEST PRSMV CHEM ANLYZR: CPT

## 2023-09-24 PROCEDURE — 80053 COMPREHEN METABOLIC PANEL: CPT

## 2023-09-24 PROCEDURE — 80048 BASIC METABOLIC PNL TOTAL CA: CPT

## 2023-09-24 PROCEDURE — 72125 CT NECK SPINE W/O DYE: CPT

## 2023-09-24 PROCEDURE — 86850 RBC ANTIBODY SCREEN: CPT

## 2023-09-24 PROCEDURE — 86900 BLOOD TYPING SEROLOGIC ABO: CPT

## 2023-09-24 PROCEDURE — 84100 ASSAY OF PHOSPHORUS: CPT

## 2023-09-24 PROCEDURE — C1889: CPT

## 2023-09-24 PROCEDURE — 36415 COLL VENOUS BLD VENIPUNCTURE: CPT

## 2023-09-24 RX ORDER — SODIUM CHLORIDE 9 MG/ML
1000 INJECTION INTRAMUSCULAR; INTRAVENOUS; SUBCUTANEOUS ONCE
Refills: 0 | Status: COMPLETED | OUTPATIENT
Start: 2023-09-24 | End: 2023-09-24

## 2023-09-24 RX ORDER — TRAMADOL HYDROCHLORIDE 50 MG/1
1 TABLET ORAL
Qty: 12 | Refills: 0
Start: 2023-09-24 | End: 2023-09-26

## 2023-09-24 RX ORDER — ASPIRIN/CALCIUM CARB/MAGNESIUM 324 MG
1 TABLET ORAL
Qty: 30 | Refills: 0
Start: 2023-09-24 | End: 2023-10-23

## 2023-09-24 RX ORDER — PANTOPRAZOLE SODIUM 20 MG/1
1 TABLET, DELAYED RELEASE ORAL
Qty: 6 | Refills: 0
Start: 2023-09-24 | End: 2023-09-29

## 2023-09-24 RX ADMIN — TRAMADOL HYDROCHLORIDE 50 MILLIGRAM(S): 50 TABLET ORAL at 02:40

## 2023-09-24 RX ADMIN — SODIUM CHLORIDE 1000 MILLILITER(S): 9 INJECTION INTRAMUSCULAR; INTRAVENOUS; SUBCUTANEOUS at 10:49

## 2023-09-24 RX ADMIN — TRAMADOL HYDROCHLORIDE 50 MILLIGRAM(S): 50 TABLET ORAL at 03:18

## 2023-09-24 NOTE — PHYSICAL THERAPY INITIAL EVALUATION ADULT - GAIT DEVIATIONS NOTED, PT EVAL
Demo fairly steady gait, no LOB observed, good navigation of hallway obstacles/decreased francois/decreased velocity of limb motion

## 2023-09-24 NOTE — PROGRESS NOTE ADULT - REASON FOR ADMISSION
pain low back radiating down right leg.

## 2023-09-24 NOTE — PROGRESS NOTE ADULT - SUBJECTIVE AND OBJECTIVE BOX
HPI:  work related injury   pain low back and legs.  hx of tachycardia with negative work up.  Patient was admitted from PACU after L5-S1 laminectomy due to urinary retention and tachycardia.  He got straight cath that put out 1L of urine in PACU. After that he was able to urinate on his own 2 times after and was able to empty his bladder.  He was admitted for observation overnight. (22 Sep 2023 06:24)    HOSPITAL COURSE: 9/22: POD 0 s/p L5-S1 microdiscectomy.    OVERNIGHT EVENTS: POD 1, LUCITA overnight. Neuro exam stable.     Vital Signs Last 24 Hrs  T(C): 36.4 (23 Sep 2023 00:31), Max: 36.8 (22 Sep 2023 07:26)  T(F): 97.5 (23 Sep 2023 00:31), Max: 98.1 (22 Sep 2023 16:47)  HR: 94 (23 Sep 2023 00:31) (69 - 120)  BP: 133/60 (23 Sep 2023 00:31) (121/73 - 157/78)  BP(mean): 85 (22 Sep 2023 16:47) (85 - 110)  RR: 18 (23 Sep 2023 00:31) (10 - 25)  SpO2: 98% (23 Sep 2023 00:31) (97% - 100%)    Parameters below as of 23 Sep 2023 00:31  Patient On (Oxygen Delivery Method): room air    I&O's Summary    22 Sep 2023 07:01  -  23 Sep 2023 03:30  --------------------------------------------------------  IN: 1980 mL / OUT: 3525 mL / NET: -1545 mL    PHYSICAL EXAM:  General: Pt is sitting up comfortably in bed, in NAD, on RA  HEENT: CN II-XII grossly intact, PERRL 3mm, EOMI B/L, face symmetric  Cardiovascular: RRR, normal S1 and S2   Respiratory: non-labored breathing, symmetric chest rise   GI: abd soft, NTND   Neuro: Filipino speaking, A&O x 3, no aphasia, speech clear, no dysmetria, no pronator drift  Strength 5/5 throughout all 4 extremities  Sensation intact to light touch throughout   Vascular: Distal pulses 2+ x4, no calf edema or erythema  Wounds: Lumbar wound C/D/I    DIET:  [] NPO  [X] Mechanical, ADAT  [] Tube feeds    CAPILLARY BLOOD GLUCOSE    Allergies    No Known Allergies    Intolerances    MEDICATIONS:  Antibiotics:    Neuro:  acetaminophen     Tablet .. 650 milliGRAM(s) Oral every 6 hours PRN  ondansetron Injectable 4 milliGRAM(s) IV Push every 6 hours PRN  oxycodone    5 mG/acetaminophen 325 mG 1 Tablet(s) Oral every 4 hours PRN  oxyCODONE    IR 5 milliGRAM(s) Oral every 4 hours PRN    Anticoagulation:  aspirin enteric coated 81 milliGRAM(s) Oral at bedtime    OTHER:  chlorhexidine 2% Cloths 1 Application(s) Topical every 12 hours  senna 2 Tablet(s) Oral at bedtime    ASSESSMENT:  27M no PMHx suffered work-related back injury resulting in LBP radiating down BL LE. S/p elective L5-S1 laminectomy (9/22/23).     M51.26    Handoff    MEWS Score    Work related injury    History of low back pain    H/O sinus tachycardia    Lumbar herniated disc    Lumbar herniated disc    Work related injury    History of low back pain    H/O sinus tachycardia    Lumbar laminectomy    History of back surgery    SysAdmin_VstLnk    PLAN:  Neuro  -neuro/vitals q4h  -pain control as needed    Cardio  -normotensive    Pulm  -RA    GI  -ADAT  -bowel regimen    Renal  -straight cath prn, retaining urine    Endo  -no active issues    Heme  -ASA 81 ok to start POD 0    ID  -afebrile    admit to tele, full code, d/w Dr. Castillo
HPI:  work related injury   pain low back and legs.  hx of tachycardia with negative work up.  Patient was admitted from PACU after L5-S1 laminectomy due to urinary retention and tachycardia.  He got straight cath that put out 1L of urine in PACU. After that he was able to urinate on his own 2 times after and was able to empty his bladder.  He was admitted for observation overnight. (22 Sep 2023 06:24)    HOSPITAL COURSE:   9/22: POD 0 s/p L5-S1 microdiscectomy. Admit to tele 2/2 urinary retention.   9/23: POD 1, LUCITA overnight. Neuro exam stable. Patient was discharged by neurosurgery team. Patient then used restroom in hospital room and reports he felt dizzy and fell. Reports he hit his head, denies LOC. Rapid response called. Patient c/o lumbar pain. Patient put in c-collar and used hard board to transfer patient from floor to stretcher. Taken for urgent CTH, CT cervical/lumbar spine. Patient AAAOx3 KOMAL x4, exam at baseline. CT showed small hyperattenuation, likely calcification.     OVERNIGHT EVENTS: POD 2, LUCITA overnight. Voiding. Neuro exam stable. Stability CTH stable.     Vital Signs Last 24 Hrs  T(C): 36.7 (23 Sep 2023 23:54), Max: 36.7 (23 Sep 2023 05:50)  T(F): 98.1 (23 Sep 2023 23:54), Max: 98.1 (23 Sep 2023 23:54)  HR: 98 (23 Sep 2023 23:54) (74 - 98)  BP: 137/88 (23 Sep 2023 23:54) (124/58 - 159/69)  BP(mean): --  RR: 17 (23 Sep 2023 23:54) (17 - 22)  SpO2: 97% (23 Sep 2023 23:54) (95% - 99%)    Parameters below as of 23 Sep 2023 23:54  Patient On (Oxygen Delivery Method): room air    I&O's Summary    22 Sep 2023 07:01  -  23 Sep 2023 07:00  --------------------------------------------------------  IN: 1980 mL / OUT: 3950 mL / NET: -1970 mL    23 Sep 2023 07:01  -  24 Sep 2023 01:16  --------------------------------------------------------  IN: 1050 mL / OUT: 1000 mL / NET: 50 mL    PHYSICAL EXAM:  General: Pt is sitting up comfortably in bed, in NAD, on RA  HEENT: CN II-XII grossly intact, PERRL 3mm, EOMI B/L, face symmetric  Cardiovascular: RRR, normal S1 and S2   Respiratory: non-labored breathing, symmetric chest rise   GI: abd soft, NTND   Neuro: [place] speaking, A&O x 3, no aphasia, speech clear, no dysmetria, no pronator drift  Strength 5/5 throughout all 4 extremities  Sensation intact to light touch throughout   Vascular: Distal pulses 2+ x4, no calf edema or erythema  Wounds: Lumbar incision C/D/I    DIET:  [] NPO  [X] Mechanical  [] Tube feeds    LABS:                        16.9   15.88 )-----------( 252      ( 23 Sep 2023 12:47 )             48.9     09-23    141  |  106  |  10  ----------------------------<  106<H>  3.6   |  25  |  0.79    Ca    9.4      23 Sep 2023 12:47  Phos  3.4     09-23  Mg     2.1     09-23    TPro  7.8  /  Alb  4.8  /  TBili  1.0  /  DBili  x   /  AST  44<H>  /  ALT  51<H>  /  AlkPhos  79  09-23    PT/INR - ( 23 Sep 2023 12:47 )   PT: 12.1 sec;   INR: 1.06        PTT - ( 23 Sep 2023 12:47 )  PTT:29.7 sec  Urinalysis Basic - ( 23 Sep 2023 12:47 )    Color: x / Appearance: x / SG: x / pH: x  Gluc: 106 mg/dL / Ketone: x  / Bili: x / Urobili: x   Blood: x / Protein: x / Nitrite: x   Leuk Esterase: x / RBC: x / WBC x   Sq Epi: x / Non Sq Epi: x / Bacteria: x    CAPILLARY BLOOD GLUCOSE    POCT Blood Glucose.: 111 mg/dL (23 Sep 2023 10:58)    Allergies    No Known Allergies    Intolerances    MEDICATIONS:  Antibiotics:    Neuro:  acetaminophen     Tablet .. 650 milliGRAM(s) Oral every 6 hours PRN  acetaminophen     Tablet .. 1000 milliGRAM(s) Oral every 6 hours PRN  ondansetron Injectable 4 milliGRAM(s) IV Push every 6 hours PRN  traMADol 50 milliGRAM(s) Oral every 4 hours PRN  traMADol 25 milliGRAM(s) Oral every 4 hours PRN    Anticoagulation:  aspirin enteric coated 81 milliGRAM(s) Oral at bedtime    OTHER:  senna 2 Tablet(s) Oral at bedtime    ASSESSMENT:  27M no PMHx suffered work-related back injury resulting in LBP radiating down BL LE. S/p elective L5-S1 laminectomy (9/22/23).     M51.26    Handoff    MEWS Score    Work related injury    History of low back pain    H/O sinus tachycardia    Lumbar herniated disc    Lumbar herniated disc    Lumbar laminectomy    Herniated nucleus pulposus, L5-S1    History of low back pain    Work related injury    History of low back pain    H/O sinus tachycardia    Lumbar laminectomy    History of back surgery    Urinary retention    SysAdmin_VstLnk    PLAN:  Neuro  -neuro/vitals q4h  -pain control as needed  - CTH 9/23 s/p fall: negative for hemorrhage     Cardio  -normotensive    Pulm  -RA    GI  -ADAT  -bowel regimen    Renal  -Voiding     Endo  -no active issues    Heme  -ASA 81 ok to start POD 0    ID  -afebrile    admit to tele, full code, d/w Dr. Castillo  
Patient is a 27y old  Male who presents with a chief complaint of pain low back radiating down right leg. (24 Sep 2023 01:15)      SUBJECTIVE:  Patient seen and examined at bedside with  ID#[id number].  Feels well today, some anxiety regarding yesterday's fall.  Denies headache, CP, SOB, palpitations, LE pain, N/V, diarrhea or constipation, fever or chills    ROS:    All other ROS negative except as above    Vital Signs Last 12 Hrs  T(F): 98.3 (09-24-23 @ 08:55), Max: 98.3 (09-24-23 @ 08:55)  HR: 125 (09-24-23 @ 11:20) (83 - 125)  BP: 145/89 (09-24-23 @ 11:20) (130/66 - 145/89)  BP(mean): --  RR: 19 (09-24-23 @ 11:20) (18 - 19)  SpO2: 98% (09-24-23 @ 11:20) (97% - 98%)  I&O's Summary    23 Sep 2023 07:01  -  24 Sep 2023 07:00  --------------------------------------------------------  IN: 1050 mL / OUT: 1000 mL / NET: 50 mL    24 Sep 2023 07:01  -  24 Sep 2023 15:06  --------------------------------------------------------  IN: 1500 mL / OUT: 0 mL / NET: 1500 mL        PHYSICAL EXAM:  Constitutional: NAD, comfortable in bed.  HEENT: PERRLA, EOMI, MMM  Neck: Supple  Respiratory: CTA B/L. No w/r/r.   Cardiovascular: Tachycardic, normal S1 and S2, no m/r/g.   Gastrointestinal: +BS, soft NTND  Extremities: wwp; no cyanosis, clubbing or edema.   Vascular: Pulses equal and strong throughout.   Neurological: AAOx3, no focal deficits    Skin: No gross skin abnormalities or rashes        LABS:                        16.9   15.88 )-----------( 252      ( 23 Sep 2023 12:47 )             48.9     09-23    141  |  106  |  10  ----------------------------<  106<H>  3.6   |  25  |  0.79    Ca    9.4      23 Sep 2023 12:47  Phos  3.4     09-23  Mg     2.1     09-23    TPro  7.8  /  Alb  4.8  /  TBili  1.0  /  DBili  x   /  AST  44<H>  /  ALT  51<H>  /  AlkPhos  79  09-23    PT/INR - ( 23 Sep 2023 12:47 )   PT: 12.1 sec;   INR: 1.06          PTT - ( 23 Sep 2023 12:47 )  PTT:29.7 sec  Urinalysis Basic - ( 23 Sep 2023 12:47 )    Color: x / Appearance: x / SG: x / pH: x  Gluc: 106 mg/dL / Ketone: x  / Bili: x / Urobili: x   Blood: x / Protein: x / Nitrite: x   Leuk Esterase: x / RBC: x / WBC x   Sq Epi: x / Non Sq Epi: x / Bacteria: x          RADIOLOGY & ADDITIONAL TESTS:    < from: CT Head No Cont (09.23.23 @ 18:35) >  IMPRESSION:  A 3.5 mm hyperattenuating focus in the medial aspect of the left   lentiform nucleus likely represents calcification.  No new intracranial   findings.    < end of copied text >    MEDICATIONS  (STANDING):  aspirin enteric coated 81 milliGRAM(s) Oral at bedtime  senna 2 Tablet(s) Oral at bedtime    MEDICATIONS  (PRN):  acetaminophen     Tablet .. 650 milliGRAM(s) Oral every 6 hours PRN Temp greater or equal to 38C (100.4F), Mild Pain (1 - 3)  ondansetron Injectable 4 milliGRAM(s) IV Push every 6 hours PRN Nausea and/or Vomiting  traMADol 50 milliGRAM(s) Oral every 4 hours PRN Severe Pain (7 - 10)  traMADol 25 milliGRAM(s) Oral every 4 hours PRN Moderate Pain (4 - 6)

## 2023-09-24 NOTE — PHYSICAL THERAPY INITIAL EVALUATION ADULT - GENERAL OBSERVATIONS, REHAB EVAL
PT IE completed. Patient received semi supine in bed +tele, +IV, +abdominal binder, NAD, willing to work with PT.

## 2023-09-24 NOTE — PROGRESS NOTE ADULT - ASSESSMENT
27M no PMHx suffered work-related back injury resulting in LBP radiating down BL LE. S/p elective L5-S1 laminectomy (9/22/23).    #Lumbar back pain  #post operative state  - s/p elective L5-S1 laminectomy (9/22/23)  - Pain management per primary team    #Vasovagal episode  #Fall  - Patient off tele standing in bathroom at time of event, tele reviewed episode of sinus ben at 5am while sleeping.  No other events  - caution with pain meds  - orthostatics  - CT head with small calcification vs bleed, CT C spine w/o fracture, stability CT head stable more consistent with calcification than bleed    #Tachycardia  - tachy to 120s, EKG and tele with sinus tach.  Responded to IV fluid bolus and patient reports some anxiety  - c/w PO hydration, pain control    #Erythrocytosis  - unclear etiology, Hgb 17 preop  - no signs of KACI, oxygen limiting events  - plan to f/u with heme outpatient  - denies anabolic steroids, workout supplements    #Leukocytosis  - likely reactive post op  - downtrending

## 2023-09-24 NOTE — DISCHARGE NOTE NURSING/CASE MANAGEMENT/SOCIAL WORK - PATIENT PORTAL LINK FT
You can access the FollowMyHealth Patient Portal offered by [facility] by registering at the following website: [email or web address]. By joining [employer]’s FollowMyHealth portal, you will also be able to view your health information using other applications (apps) compatible with our system.

## 2023-09-24 NOTE — PHYSICAL THERAPY INITIAL EVALUATION ADULT - PERTINENT HX OF CURRENT PROBLEM, REHAB EVAL
27M no PMHx suffered work-related back injury resulting in LBP radiating down BL LE. S/p elective L5-S1 laminectomy (9/22/23).

## 2023-09-24 NOTE — PHYSICAL THERAPY INITIAL EVALUATION ADULT - ADDITIONAL COMMENTS
Active community ambulator who lives with his sister, brother in-law and nephew in 2nd floor walk up apartment. Independent with all ADLs prior and denies use of AD when ambulating.

## 2023-09-28 DIAGNOSIS — R33.9 RETENTION OF URINE, UNSPECIFIED: ICD-10-CM

## 2023-09-28 DIAGNOSIS — M51.27 OTHER INTERVERTEBRAL DISC DISPLACEMENT, LUMBOSACRAL REGION: ICD-10-CM

## 2023-09-28 DIAGNOSIS — R55 SYNCOPE AND COLLAPSE: ICD-10-CM

## 2023-09-28 DIAGNOSIS — D75.1 SECONDARY POLYCYTHEMIA: ICD-10-CM

## 2023-09-28 DIAGNOSIS — R00.0 TACHYCARDIA, UNSPECIFIED: ICD-10-CM

## 2023-10-03 LAB — SURGICAL PATHOLOGY STUDY: SIGNIFICANT CHANGE UP

## 2025-02-24 NOTE — PHYSICAL THERAPY INITIAL EVALUATION ADULT - LEVEL OF INDEPENDENCE: STAND/SIT, REHAB EVAL
independent

## (undated) DEVICE — NDL SPINAL 18G X 3.5" (PINK)

## (undated) DEVICE — PACK SPINE

## (undated) DEVICE — DRSG DERMABOND 0.7ML

## (undated) DEVICE — STAPLER SKIN PROXIMATE

## (undated) DEVICE — SYR CONTROL LUER LOK 10CC

## (undated) DEVICE — DRSG TEGADERM 4X4.75"

## (undated) DEVICE — VENODYNE/SCD SLEEVE CALF MEDIUM

## (undated) DEVICE — VAGINAL PACKING 2"

## (undated) DEVICE — MIDAS REX MR8 MATCH HEAD FLUTED LG BORE 3MM X 14CM

## (undated) DEVICE — SUT VICRYL 0 18" CT-1 UNDYED (POP-OFF)

## (undated) DEVICE — ELCTR AQUAMANTYS BIPOLAR SEALER 6.0

## (undated) DEVICE — MARKING PEN W RULER

## (undated) DEVICE — DRSG TELFA 3 X 8

## (undated) DEVICE — PREP CHLORAPREP HI-LITE ORANGE 26ML

## (undated) DEVICE — NDL HYPO SAFE 22G X 1.5" (BLACK)

## (undated) DEVICE — DRAPE LIGHT HANDLE COVER (GREEN)

## (undated) DEVICE — DRAPE INSTRUMENT POUCH 6.75" X 11"

## (undated) DEVICE — BIPOLAR FORCEP SYMMETRY BAYONET 7" X 1.5MM SMOOTH (SILVER)

## (undated) DEVICE — GLV 7.5 PROTEXIS (WHITE)

## (undated) DEVICE — SYR LUER LOK 20CC

## (undated) DEVICE — DRAPE C ARM 41X74"

## (undated) DEVICE — MIDAS REX MR8 BALL FLUTED SM BORE 6MM X 10CM